# Patient Record
Sex: MALE | Race: WHITE | URBAN - METROPOLITAN AREA
[De-identification: names, ages, dates, MRNs, and addresses within clinical notes are randomized per-mention and may not be internally consistent; named-entity substitution may affect disease eponyms.]

---

## 2017-05-03 ENCOUNTER — OFFICE VISIT (OUTPATIENT)
Dept: FAMILY MEDICINE CLINIC | Facility: CLINIC | Age: 47
End: 2017-05-03

## 2017-05-03 VITALS
SYSTOLIC BLOOD PRESSURE: 118 MMHG | TEMPERATURE: 98 F | HEIGHT: 72 IN | OXYGEN SATURATION: 95 % | WEIGHT: 223 LBS | DIASTOLIC BLOOD PRESSURE: 70 MMHG | HEART RATE: 68 BPM | BODY MASS INDEX: 30.2 KG/M2 | RESPIRATION RATE: 16 BRPM

## 2017-05-03 DIAGNOSIS — J06.9 VIRAL UPPER RESPIRATORY TRACT INFECTION: Primary | ICD-10-CM

## 2017-05-03 PROCEDURE — 99202 OFFICE O/P NEW SF 15 MIN: CPT | Performed by: FAMILY MEDICINE

## 2017-05-03 RX ORDER — DOXEPIN HYDROCHLORIDE 50 MG/1
1 CAPSULE ORAL DAILY
COMMUNITY

## 2017-05-03 NOTE — PATIENT INSTRUCTIONS
Good check today   If persisting symptoms by Saturday - start flonase nasal spray 1 spray each nare twice daily for 3 - 7 days.      Plan for recheck in 1y

## 2017-05-03 NOTE — PROGRESS NOTES
[address]  PROGRESS NOTE  Chief Complaint:   Patient presents with:  Sinusitis: symptom started approx. 4 days    History was provided by patient    HPI:   This is a 51year old male coming in for  Sinusitis  This is a new problem.  The encounter: 223 lb. Vital signs reviewed. Physical Exam:  Physical Exam   Constitutional: He appears well-developed and well-nourished. HENT:   Head: Atraumatic. Nose: Mucosal edema and rhinorrhea present. No sinus tenderness.    Eyes: Conjunctivae ar

## 2023-07-25 ENCOUNTER — TELEPHONE (OUTPATIENT)
Dept: FAMILY MEDICINE CLINIC | Facility: CLINIC | Age: 53
End: 2023-07-25

## 2023-07-25 NOTE — TELEPHONE ENCOUNTER
Patient walked in wanting to schedule a px appt and labs, states that he has not seen the Dr in a while. Patient's last appt was in 2017, wants to know if Dr Fatoumata Redmond would take him back as a new pt. Pt states that he would like to keep  as his PCP.

## 2023-08-28 NOTE — TELEPHONE ENCOUNTER
Called pt and left detailed message informing that Dr Wing Mao is unfortunately not able to take him as a pt this time and to call back if there was any questions.

## (undated) NOTE — MR AVS SNAPSHOT
[address] [place]  [address] [id number]  [phone]               Thank you for choosing us for your health care visit with Jenise Robbins MD.  We are glad to serve you and happy to provide you with this summary of Support Staff. Remember, MyChart is NOT to be used for urgent needs. For medical emergencies, dial 911.         Educational Information     Healthy Diet and Regular Exercise  [employer] for making healthy food choices  -   Enjoy